# Patient Record
Sex: MALE | Race: WHITE | URBAN - METROPOLITAN AREA
[De-identification: names, ages, dates, MRNs, and addresses within clinical notes are randomized per-mention and may not be internally consistent; named-entity substitution may affect disease eponyms.]

---

## 2022-01-10 ENCOUNTER — OFFICE VISIT (OUTPATIENT)
Dept: URBAN - METROPOLITAN AREA CLINIC 10 | Facility: CLINIC | Age: 67
End: 2022-01-10
Payer: MEDICARE

## 2022-01-10 DIAGNOSIS — H10.422 SIMPLE CHRONIC CONJUNCTIVITIS, LEFT EYE: Primary | ICD-10-CM

## 2022-01-10 PROCEDURE — 99203 OFFICE O/P NEW LOW 30 MIN: CPT | Performed by: OPTOMETRIST

## 2022-01-10 RX ORDER — FLUOROMETHOLONE 1 MG/ML
0.1 % SUSPENSION/ DROPS OPHTHALMIC
Qty: 2.5 | Refills: 1 | Status: ACTIVE
Start: 2022-01-10

## 2022-01-10 NOTE — IMPRESSION/PLAN
Impression: Simple chronic conjunctivitis, left eye: H10.422. Plan: Likely contact dermatitis/conjunctivitis by hx. Improving. No FB on exam.
Cont. hydrocortisone cream for lids bid. Add FML tid x 1 wk then bid x 1 wk OS. RTC for NI or new symptoms. Pt. is being seen elsewhere for routine care.